# Patient Record
Sex: MALE | Race: WHITE | NOT HISPANIC OR LATINO | Employment: FULL TIME | URBAN - METROPOLITAN AREA
[De-identification: names, ages, dates, MRNs, and addresses within clinical notes are randomized per-mention and may not be internally consistent; named-entity substitution may affect disease eponyms.]

---

## 2018-07-04 ENCOUNTER — OFFICE VISIT (OUTPATIENT)
Dept: URGENT CARE | Facility: CLINIC | Age: 48
End: 2018-07-04
Payer: COMMERCIAL

## 2018-07-04 VITALS
TEMPERATURE: 97.1 F | WEIGHT: 140 LBS | RESPIRATION RATE: 18 BRPM | HEIGHT: 70 IN | HEART RATE: 78 BPM | BODY MASS INDEX: 20.04 KG/M2 | DIASTOLIC BLOOD PRESSURE: 98 MMHG | SYSTOLIC BLOOD PRESSURE: 131 MMHG | OXYGEN SATURATION: 97 %

## 2018-07-04 DIAGNOSIS — J40 BRONCHITIS: Primary | ICD-10-CM

## 2018-07-04 PROCEDURE — 99203 OFFICE O/P NEW LOW 30 MIN: CPT | Performed by: PHYSICIAN ASSISTANT

## 2018-07-04 RX ORDER — PREDNISONE 50 MG/1
50 TABLET ORAL DAILY
Qty: 5 TABLET | Refills: 0 | Status: SHIPPED | OUTPATIENT
Start: 2018-07-04 | End: 2018-07-09

## 2018-07-04 RX ORDER — ALBUTEROL SULFATE 90 UG/1
1 AEROSOL, METERED RESPIRATORY (INHALATION) EVERY 4 HOURS PRN
Qty: 1 INHALER | Refills: 0 | Status: SHIPPED | OUTPATIENT
Start: 2018-07-04 | End: 2018-07-14

## 2018-07-04 RX ORDER — AMOXICILLIN AND CLAVULANATE POTASSIUM 875; 125 MG/1; MG/1
1 TABLET, FILM COATED ORAL EVERY 12 HOURS SCHEDULED
Qty: 20 TABLET | Refills: 0 | Status: SHIPPED | OUTPATIENT
Start: 2018-07-04 | End: 2018-07-14

## 2018-07-04 NOTE — PROGRESS NOTES
[facility]        NAME: Milagros Armijo is a 46 y o  male  : 1970    MRN: [id number]  DATE: 2018  TIME: 9:47 AM    Assessment and Plan   Bronchitis [J40]  1  Bronchitis  amoxicillin-clavulanate (AUGMENTIN) 875-125 mg per tablet    predniSONE 50 mg tablet    albuterol (PROVENTIL HFA,VENTOLIN HFA) 90 mcg/act inhaler     Patient Instructions     Take antibiotic, steroid, and albuterol as prescribed  Follow up with PCP in 3-5 days  Proceed to  ER if symptoms worsen  Chief Complaint     Chief Complaint   Patient presents with    Cold Like Symptoms     sinus pressure, chest congestion and sore throat     History of Present Illness       Cough   This is a new problem  Episode onset: 1 week  The problem has been gradually worsening  The problem occurs every few minutes  The cough is non-productive  Associated symptoms include nasal congestion, postnasal drip, rhinorrhea, a sore throat and wheezing  Pertinent negatives include no chest pain, chills, ear congestion, ear pain, fever, headaches, heartburn, hemoptysis, myalgias, rash, shortness of breath, sweats or weight loss  Nothing aggravates the symptoms  He has tried nothing for the symptoms  The treatment provided no relief  There is no history of asthma, bronchiectasis, bronchitis, COPD, emphysema, environmental allergies or pneumonia  Review of Systems   Review of Systems   Constitutional: Negative for chills, fever and weight loss  HENT: Positive for postnasal drip, rhinorrhea and sore throat  Negative for ear pain  Respiratory: Positive for cough and wheezing  Negative for hemoptysis and shortness of breath  Cardiovascular: Negative for chest pain  Gastrointestinal: Negative for heartburn  Musculoskeletal: Negative for myalgias  Skin: Negative for rash  Allergic/Immunologic: Negative for environmental allergies  Neurological: Negative for headaches           Current Medications       Current Outpatient Prescriptions:     albuterol (PROVENTIL HFA,VENTOLIN HFA) 90 mcg/act inhaler, Inhale 1 puff every 4 (four) hours as needed for wheezing for up to 10 days, Disp: 1 Inhaler, Rfl: 0    amoxicillin-clavulanate (AUGMENTIN) 875-125 mg per tablet, Take 1 tablet by mouth every 12 (twelve) hours for 10 days, Disp: 20 tablet, Rfl: 0    predniSONE 50 mg tablet, Take 1 tablet (50 mg total) by mouth daily for 5 days, Disp: 5 tablet, Rfl: 0    Current Allergies     Allergies as of 07/04/2018 - Reviewed 07/04/2018   Allergen Reaction Noted    Tolectin [tolmetin]  07/04/2018            The following portions of the patient's history were reviewed and updated as appropriate: allergies, current medications, past family history, past medical history, past social history, past surgical history and problem list      Past Medical History:   Diagnosis Date    Arthritis     Emphysema of lung ([address] )        Past Surgical History:   Procedure Laterality Date    KNEE SURGERY Left     POLYPECTOMY         No family history on file  Medications have been verified  Objective   /98   Pulse 78   Temp (!) 97 1 °F (36 2 °C) (Tympanic)   Resp 18   Ht 5' 10" (1 778 m)   Wt 63 5 kg (140 lb)   SpO2 97%   BMI 20 09 kg/m²        Physical Exam     Physical Exam   Constitutional: He appears well-developed  HENT:   Head: Normocephalic  Right Ear: External ear normal    Left Ear: External ear normal    Nose: Mucosal edema and rhinorrhea (purulent) present  Mouth/Throat: Posterior oropharyngeal erythema present  No oropharyngeal exudate or posterior oropharyngeal edema  Cardiovascular: Normal rate, regular rhythm, normal heart sounds and intact distal pulses  Exam reveals no gallop and no friction rub  No murmur heard  Pulmonary/Chest: Effort normal  No respiratory distress  He has no decreased breath sounds  He has wheezes  He has no rhonchi  He has no rales  Dry nonproductive cough   Abdominal: Soft   Bowel sounds are normal  He exhibits no distension  There is no tenderness  There is no rebound and no guarding

## 2021-12-31 ENCOUNTER — OFFICE VISIT (OUTPATIENT)
Dept: URGENT CARE | Facility: CLINIC | Age: 51
End: 2021-12-31
Payer: COMMERCIAL

## 2021-12-31 VITALS
HEIGHT: 70 IN | WEIGHT: 145 LBS | RESPIRATION RATE: 18 BRPM | TEMPERATURE: 96.8 F | HEART RATE: 95 BPM | BODY MASS INDEX: 20.76 KG/M2 | OXYGEN SATURATION: 98 %

## 2021-12-31 DIAGNOSIS — R68.89 FLU-LIKE SYMPTOMS: Primary | ICD-10-CM

## 2021-12-31 PROCEDURE — 87636 SARSCOV2 & INF A&B AMP PRB: CPT | Performed by: EMERGENCY MEDICINE

## 2021-12-31 PROCEDURE — 99203 OFFICE O/P NEW LOW 30 MIN: CPT | Performed by: EMERGENCY MEDICINE

## 2021-12-31 RX ORDER — SUMATRIPTAN 100 MG/1
100 TABLET, FILM COATED ORAL 2 TIMES DAILY PRN
COMMUNITY

## 2022-01-06 LAB
FLUAV RNA RESP QL NAA+PROBE: NEGATIVE
FLUBV RNA RESP QL NAA+PROBE: NEGATIVE
SARS-COV-2 RNA RESP QL NAA+PROBE: POSITIVE

## 2022-01-07 ENCOUNTER — TELEPHONE (OUTPATIENT)
Dept: URGENT CARE | Facility: CLINIC | Age: 52
End: 2022-01-07

## 2024-08-27 ENCOUNTER — APPOINTMENT (OUTPATIENT)
Dept: RADIOLOGY | Facility: CLINIC | Age: 54
End: 2024-08-27
Payer: COMMERCIAL

## 2024-08-27 ENCOUNTER — OFFICE VISIT (OUTPATIENT)
Dept: URGENT CARE | Facility: CLINIC | Age: 54
End: 2024-08-27
Payer: COMMERCIAL

## 2024-08-27 VITALS
WEIGHT: 159.4 LBS | RESPIRATION RATE: 18 BRPM | DIASTOLIC BLOOD PRESSURE: 85 MMHG | OXYGEN SATURATION: 96 % | SYSTOLIC BLOOD PRESSURE: 132 MMHG | HEART RATE: 78 BPM | BODY MASS INDEX: 22.82 KG/M2 | HEIGHT: 70 IN | TEMPERATURE: 98 F

## 2024-08-27 DIAGNOSIS — S29.9XXA RIB INJURY: ICD-10-CM

## 2024-08-27 DIAGNOSIS — M94.0 COSTOCHONDRITIS: Primary | ICD-10-CM

## 2024-08-27 PROCEDURE — 71101 X-RAY EXAM UNILAT RIBS/CHEST: CPT

## 2024-08-27 PROCEDURE — 99213 OFFICE O/P EST LOW 20 MIN: CPT | Performed by: PHYSICIAN ASSISTANT

## 2024-08-27 RX ORDER — PREDNISONE 20 MG/1
50 TABLET ORAL DAILY
Qty: 13 TABLET | Refills: 0 | Status: SHIPPED | OUTPATIENT
Start: 2024-08-27 | End: 2024-09-01

## 2024-08-27 RX ORDER — METHOCARBAMOL 500 MG/1
500 TABLET, FILM COATED ORAL 4 TIMES DAILY
Qty: 30 TABLET | Refills: 0 | Status: SHIPPED | OUTPATIENT
Start: 2024-08-27

## 2024-08-27 NOTE — PROGRESS NOTES
[facility]        NAME: Moises Keller is a 54 y.o. male  : 1970    MRN: [id number]  DATE: 2024  TIME: 4:45 PM    Assessment and Plan   Costochondritis [M94.0]  1. Costochondritis  XR ribs right w pa chest min 3 views    predniSONE 20 mg tablet    methocarbamol (ROBAXIN) 500 mg tablet            Patient Instructions   Ice then heat.  Be sure to take deep breaths to prevent pneumonia.  Medications as prescribed.  Tylenol.  Hand splint.    Follow up with PCP in 3-5 days.  Proceed to  ER if symptoms worsen.    If tests have been performed at [facility], our office will contact you with results if changes need to be made to the care plan discussed with you at the visit.  [facility].    Chief Complaint     Chief Complaint   Patient presents with    Rib Injury     Right rib pain from working on truck and heard a pop since Thursday          History of Present Illness       Patient is a 54-year-old male with significant past medical history presents the office complaining of right rib pain 6 days.  States he was working on his car leaning over the bumper pulling on a wrench when he felt a pop and sudden pain in his right rib.  Pain is rated 4 out of 10 at rest and 10 out of 10 with any movements, twisting, pulling, or deep breaths.  He has not taken anything for pain.  Denies fevers, chills, cough, shortness of breath.        Review of Systems   Review of Systems   Constitutional:  Negative for fever.   Respiratory:  Negative for cough and shortness of breath.    Cardiovascular:  Positive for chest pain (rib).         Current Medications       Current Outpatient Medications:     methocarbamol (ROBAXIN) 500 mg tablet, Take 1 tablet (500 mg total) by mouth 4 (four) times a day, Disp: 30 tablet, Rfl: 0    predniSONE 20 mg tablet, Take 2.5 tablets (50 mg total) by mouth daily for 5 days, Disp: 13 tablet, Rfl: 0    Current Allergies     Allergies as of  "08/27/2024 - Reviewed 08/27/2024   Allergen Reaction Noted    Tolectin [tolmetin] Rash 07/04/2018            The following portions of the patient's history were reviewed and updated as appropriate: allergies, current medications, past family history, past medical history, past social history, past surgical history and problem list.     Past Medical History:   Diagnosis Date    Arthritis     Emphysema of lung (HCC)     Migraines        Past Surgical History:   Procedure Laterality Date    KNEE SURGERY Left     POLYPECTOMY         Family History   Problem Relation Age of Onset    No Known Problems Mother     Diabetes Father          Medications have been verified.        Objective   /85   Pulse 78   Temp 98 °F (36.7 °C) (Tympanic)   Resp 18   Ht 5' 10\" (1.778 m)   Wt 72.3 kg (159 lb 6.4 oz)   SpO2 96%   BMI 22.87 kg/m²   No LMP for male patient.       Physical Exam     Physical Exam  Vitals and nursing note reviewed.   Constitutional:       Appearance: He is well-developed.   HENT:      Head: Normocephalic and atraumatic.      Right Ear: External ear normal.      Left Ear: External ear normal.      Nose: Nose normal.   Eyes:      General: Lids are normal.      Conjunctiva/sclera: Conjunctivae normal.   Cardiovascular:      Rate and Rhythm: Normal rate and regular rhythm.   Pulmonary:      Effort: Pulmonary effort is normal.      Breath sounds: Normal breath sounds.   Chest:      Chest wall: Tenderness present.       Skin:     General: Skin is warm and dry.      Capillary Refill: Capillary refill takes less than 2 seconds.   Neurological:      Mental Status: He is alert.           Right rib and chest x-ray: Within normal limits.  No rib fracture noted.        "

## 2024-11-30 ENCOUNTER — APPOINTMENT (EMERGENCY)
Dept: CT IMAGING | Facility: HOSPITAL | Age: 54
End: 2024-11-30
Payer: COMMERCIAL

## 2024-11-30 ENCOUNTER — HOSPITAL ENCOUNTER (EMERGENCY)
Facility: HOSPITAL | Age: 54
Discharge: HOME | End: 2024-11-30
Attending: EMERGENCY MEDICINE
Payer: COMMERCIAL

## 2024-11-30 VITALS
WEIGHT: 142 LBS | OXYGEN SATURATION: 96 % | HEART RATE: 79 BPM | RESPIRATION RATE: 18 BRPM | TEMPERATURE: 98.2 F | SYSTOLIC BLOOD PRESSURE: 139 MMHG | BODY MASS INDEX: 20.33 KG/M2 | DIASTOLIC BLOOD PRESSURE: 92 MMHG | HEIGHT: 70 IN

## 2024-11-30 DIAGNOSIS — E27.9 ADRENAL NODULE (HCC): ICD-10-CM

## 2024-11-30 DIAGNOSIS — S29.012A STRAIN OF THORACIC BACK REGION: ICD-10-CM

## 2024-11-30 DIAGNOSIS — R10.9 RIGHT FLANK PAIN: ICD-10-CM

## 2024-11-30 DIAGNOSIS — K86.2 PANCREATIC CYST: ICD-10-CM

## 2024-11-30 DIAGNOSIS — R10.11 RIGHT UPPER QUADRANT ABDOMINAL PAIN: Primary | ICD-10-CM

## 2024-11-30 LAB
ALBUMIN SERPL BCG-MCNC: 3.8 G/DL (ref 3.5–5)
ALP SERPL-CCNC: 87 U/L (ref 34–104)
ALT SERPL W P-5'-P-CCNC: 8 U/L (ref 7–52)
ANION GAP SERPL CALCULATED.3IONS-SCNC: 5 MMOL/L (ref 4–13)
AST SERPL W P-5'-P-CCNC: 10 U/L (ref 13–39)
BASOPHILS # BLD AUTO: 0.1 THOUSANDS/ΜL (ref 0–0.1)
BASOPHILS NFR BLD AUTO: 1 % (ref 0–1)
BILIRUB SERPL-MCNC: 0.37 MG/DL (ref 0.2–1)
BILIRUB UR QL STRIP: NEGATIVE
BUN SERPL-MCNC: 11 MG/DL (ref 5–25)
CALCIUM SERPL-MCNC: 8.7 MG/DL (ref 8.4–10.2)
CHLORIDE SERPL-SCNC: 105 MMOL/L (ref 96–108)
CLARITY UR: CLEAR
CO2 SERPL-SCNC: 28 MMOL/L (ref 21–32)
COLOR UR: YELLOW
CREAT SERPL-MCNC: 1 MG/DL (ref 0.6–1.3)
EOSINOPHIL # BLD AUTO: 0.17 THOUSAND/ΜL (ref 0–0.61)
EOSINOPHIL NFR BLD AUTO: 2 % (ref 0–6)
ERYTHROCYTE [DISTWIDTH] IN BLOOD BY AUTOMATED COUNT: 14.2 % (ref 11.6–15.1)
GFR SERPL CREATININE-BSD FRML MDRD: 83 ML/MIN/1.73SQ M
GLUCOSE SERPL-MCNC: 135 MG/DL (ref 65–140)
GLUCOSE UR STRIP-MCNC: NEGATIVE MG/DL
HCT VFR BLD AUTO: 48 % (ref 36.5–49.3)
HGB BLD-MCNC: 15.5 G/DL (ref 12–17)
HGB UR QL STRIP.AUTO: NEGATIVE
IMM GRANULOCYTES # BLD AUTO: 0.04 THOUSAND/UL (ref 0–0.2)
IMM GRANULOCYTES NFR BLD AUTO: 0 % (ref 0–2)
KETONES UR STRIP-MCNC: NEGATIVE MG/DL
LACTATE SERPL-SCNC: 1.3 MMOL/L (ref 0.5–2)
LEUKOCYTE ESTERASE UR QL STRIP: NEGATIVE
LIPASE SERPL-CCNC: 23 U/L (ref 11–82)
LYMPHOCYTES # BLD AUTO: 2.63 THOUSANDS/ΜL (ref 0.6–4.47)
LYMPHOCYTES NFR BLD AUTO: 24 % (ref 14–44)
MCH RBC QN AUTO: 29.7 PG (ref 26.8–34.3)
MCHC RBC AUTO-ENTMCNC: 32.3 G/DL (ref 31.4–37.4)
MCV RBC AUTO: 92 FL (ref 82–98)
MONOCYTES # BLD AUTO: 0.81 THOUSAND/ΜL (ref 0.17–1.22)
MONOCYTES NFR BLD AUTO: 7 % (ref 4–12)
NEUTROPHILS # BLD AUTO: 7.28 THOUSANDS/ΜL (ref 1.85–7.62)
NEUTS SEG NFR BLD AUTO: 66 % (ref 43–75)
NITRITE UR QL STRIP: NEGATIVE
NRBC BLD AUTO-RTO: 0 /100 WBCS
PH UR STRIP.AUTO: 6.5 [PH]
PLATELET # BLD AUTO: 210 THOUSANDS/UL (ref 149–390)
PMV BLD AUTO: 10.7 FL (ref 8.9–12.7)
POTASSIUM SERPL-SCNC: 4.1 MMOL/L (ref 3.5–5.3)
PROT SERPL-MCNC: 6.5 G/DL (ref 6.4–8.4)
PROT UR STRIP-MCNC: NEGATIVE MG/DL
RBC # BLD AUTO: 5.22 MILLION/UL (ref 3.88–5.62)
SODIUM SERPL-SCNC: 138 MMOL/L (ref 135–147)
SP GR UR STRIP.AUTO: 1.02 (ref 1–1.03)
UROBILINOGEN UR QL STRIP.AUTO: 0.2 E.U./DL
WBC # BLD AUTO: 11.03 THOUSAND/UL (ref 4.31–10.16)

## 2024-11-30 PROCEDURE — 85025 COMPLETE CBC W/AUTO DIFF WBC: CPT | Performed by: EMERGENCY MEDICINE

## 2024-11-30 PROCEDURE — 83605 ASSAY OF LACTIC ACID: CPT | Performed by: EMERGENCY MEDICINE

## 2024-11-30 PROCEDURE — 80053 COMPREHEN METABOLIC PANEL: CPT | Performed by: EMERGENCY MEDICINE

## 2024-11-30 PROCEDURE — 99285 EMERGENCY DEPT VISIT HI MDM: CPT | Performed by: EMERGENCY MEDICINE

## 2024-11-30 PROCEDURE — 81003 URINALYSIS AUTO W/O SCOPE: CPT | Performed by: EMERGENCY MEDICINE

## 2024-11-30 PROCEDURE — 96375 TX/PRO/DX INJ NEW DRUG ADDON: CPT

## 2024-11-30 PROCEDURE — 96376 TX/PRO/DX INJ SAME DRUG ADON: CPT

## 2024-11-30 PROCEDURE — 36415 COLL VENOUS BLD VENIPUNCTURE: CPT | Performed by: EMERGENCY MEDICINE

## 2024-11-30 PROCEDURE — 99284 EMERGENCY DEPT VISIT MOD MDM: CPT

## 2024-11-30 PROCEDURE — 74177 CT ABD & PELVIS W/CONTRAST: CPT

## 2024-11-30 PROCEDURE — 96374 THER/PROPH/DIAG INJ IV PUSH: CPT

## 2024-11-30 PROCEDURE — 96361 HYDRATE IV INFUSION ADD-ON: CPT

## 2024-11-30 PROCEDURE — 83690 ASSAY OF LIPASE: CPT | Performed by: EMERGENCY MEDICINE

## 2024-11-30 RX ORDER — KETOROLAC TROMETHAMINE 30 MG/ML
15 INJECTION, SOLUTION INTRAMUSCULAR; INTRAVENOUS ONCE
Status: COMPLETED | OUTPATIENT
Start: 2024-11-30 | End: 2024-11-30

## 2024-11-30 RX ORDER — CYCLOBENZAPRINE HCL 10 MG
10 TABLET ORAL ONCE
Status: COMPLETED | OUTPATIENT
Start: 2024-11-30 | End: 2024-11-30

## 2024-11-30 RX ORDER — CYCLOBENZAPRINE HCL 10 MG
10 TABLET ORAL 3 TIMES DAILY PRN
Qty: 20 TABLET | Refills: 0 | Status: SHIPPED | OUTPATIENT
Start: 2024-11-30

## 2024-11-30 RX ORDER — IBUPROFEN 600 MG/1
600 TABLET, FILM COATED ORAL EVERY 8 HOURS PRN
Qty: 30 TABLET | Refills: 0 | Status: SHIPPED | OUTPATIENT
Start: 2024-11-30

## 2024-11-30 RX ORDER — ONDANSETRON 2 MG/ML
4 INJECTION INTRAMUSCULAR; INTRAVENOUS ONCE
Status: COMPLETED | OUTPATIENT
Start: 2024-11-30 | End: 2024-11-30

## 2024-11-30 RX ADMIN — IOHEXOL 85 ML: 350 INJECTION, SOLUTION INTRAVENOUS at 20:26

## 2024-11-30 RX ADMIN — KETOROLAC TROMETHAMINE 15 MG: 30 INJECTION, SOLUTION INTRAMUSCULAR; INTRAVENOUS at 19:14

## 2024-11-30 RX ADMIN — SODIUM CHLORIDE 1000 ML: 0.9 INJECTION, SOLUTION INTRAVENOUS at 19:14

## 2024-11-30 RX ADMIN — CYCLOBENZAPRINE 10 MG: 10 TABLET, FILM COATED ORAL at 22:10

## 2024-11-30 RX ADMIN — ONDANSETRON 4 MG: 2 INJECTION INTRAMUSCULAR; INTRAVENOUS at 19:14

## 2024-11-30 RX ADMIN — KETOROLAC TROMETHAMINE 15 MG: 30 INJECTION, SOLUTION INTRAMUSCULAR; INTRAVENOUS at 22:10

## 2024-11-30 NOTE — ED PROVIDER NOTES
Time reflects when diagnosis was documented in both MDM as applicable and the Disposition within this note       Time User Action Codes Description Comment    11/30/2024 10:35 PM Theodore Castaneda [R10.11] Right upper quadrant abdominal pain     11/30/2024 10:35 PM Theodore Castaneda [R10.9] Right flank pain     11/30/2024 10:35 PM Theodore Castaneda [S29.012A] Strain of thoracic back region     11/30/2024 10:35 PM Theodore Castaneda [K86.2] Pancreatic cyst     11/30/2024 10:37 PM Theodore Castaneda [E27.9] Adrenal nodule (HCC)           ED Disposition       ED Disposition   Discharge    Condition   Stable    Date/Time   Sat Nov 30, 2024 10:34 PM    Comment   Moises Keller discharge to home/self care.                   Assessment & Plan       Medical Decision Making  Patient seen and evaluated.  Presents with right upper quadrant abdominal pain/right flank pain as described.  Workup as shown.  Differential diagnosis included acute cholecystitis, biliary colic, pancreatitis, small bowel obstruction, renal colic, pyelonephritis, sepsis, other.  Medicated as noted.  Improved with Toradol in the ED.  ED course as noted.  No concerning laboratory abnormalities.  Based on normal ED workup and patient's while localized pain, suspect musculoskeletal etiology to patient's pain.  Patient and daughter at bedside concerned about family history of gallbladder disease.  Did recommend outpatient follow-up for consideration of further workup as indicated.  Will prescribe ibuprofen and Flexeril.  Patient also advised of incidental findings of pancreatic head cyst, which she was already aware, as well as left adrenal nodule.  He expressed understanding.  Stable for discharge from the emergency department.  Return precautions reviewed.  All questions answered.    Amount and/or Complexity of Data Reviewed  Labs: ordered.  Radiology: ordered.    Risk  Prescription drug management.        ED Course as of 11/30/24 2241   Sat Nov 30,  2024 2154 Blood work and CT results discussed with patient.  Reports partial improvement after receiving Toradol.  Patient clarified that although his pain did start after eating, it was quite sometime after the eating, he does not think that his pain is necessarily related to food intake.  He states the pain is seemingly well localized to the right flank area.  Will give additional Toradol and Flexeril.  Able to provide urine sample at this time.       Medications   sodium chloride 0.9 % bolus 1,000 mL (0 mL Intravenous Stopped 11/30/24 2014)   ketorolac (TORADOL) injection 15 mg (15 mg Intravenous Given 11/30/24 1914)   ondansetron (ZOFRAN) injection 4 mg (4 mg Intravenous Given 11/30/24 1914)   iohexol (OMNIPAQUE) 350 MG/ML injection (MULTI-DOSE) 85 mL (85 mL Intravenous Given 11/30/24 2026)   ketorolac (TORADOL) injection 15 mg (15 mg Intravenous Given 11/30/24 2210)   cyclobenzaprine (FLEXERIL) tablet 10 mg (10 mg Oral Given 11/30/24 2210)       ED Risk Strat Scores                                               History of Present Illness       Chief Complaint   Patient presents with    Flank Pain     Pt has worsening right flank pain since Thursday that radiates into his mid-lower abdomen. Pt also having pain in his right shoulder and states the pain worsens after eating        Past Medical History:   Diagnosis Date    Arthritis     Emphysema of lung (HCC)     Migraines       Past Surgical History:   Procedure Laterality Date    KNEE SURGERY Left     POLYPECTOMY        Family History   Problem Relation Age of Onset    No Known Problems Mother     Diabetes Father       Social History     Tobacco Use    Smoking status: Every Day     Current packs/day: 1.00     Types: Cigarettes    Smokeless tobacco: Never   Vaping Use    Vaping status: Never Used   Substance Use Topics    Alcohol use: Not Currently    Drug use: Not Currently      E-Cigarette/Vaping    E-Cigarette Use Never User       E-Cigarette/Vaping  Substances      I have reviewed and agree with the history as documented.     Patient presents to the emergency department for evaluation of right-sided abdominal pain that has been present since Thursday.  Seems to be worse with eating.  Has happened occasionally prior to Thursday, but has been more persistent since Thursday.  Seems to start in the right side of the abdomen, radiates to the flank, and also to the right shoulder.  No associated fevers, chills, nausea, vomiting, diarrhea, or urinary symptoms.  Patient's daughters recently had their gallbladders out.  Patient denies any prior abdominal surgeries or known history of kidney stones.  Has not taken any medications for the symptoms.  Rates the pain at 10 out of 10.  No other complaints, modifying factors, or associated symptoms.        Review of Systems   All other systems reviewed and are negative.          Objective       ED Triage Vitals   Temperature Pulse Blood Pressure Respirations SpO2 Patient Position - Orthostatic VS   11/30/24 1835 11/30/24 1835 11/30/24 1837 11/30/24 1835 11/30/24 1835 11/30/24 1835   98.2 °F (36.8 °C) 99 (!) 159/107 18 98 % Sitting      Temp Source Heart Rate Source BP Location FiO2 (%) Pain Score    11/30/24 1835 11/30/24 1835 11/30/24 1835 -- 11/30/24 1835    Temporal Monitor Left arm  10 - Worst Possible Pain      Vitals      Date and Time Temp Pulse SpO2 Resp BP Pain Score FACES Pain Rating User   11/30/24 2219 -- 79 96 % 18 139/92 -- -- MD   11/30/24 2210 -- -- -- -- -- 4 -- MD   11/30/24 1945 -- -- -- -- -- 6 -- MD   11/30/24 1914 -- -- -- -- -- 10 - Worst Possible Pain -- MD   11/30/24 1837 -- -- -- -- 159/107 -- --    11/30/24 1835 98.2 °F (36.8 °C) 99 98 % 18 -- 10 - Worst Possible Pain --             Physical Exam  Vitals and nursing note reviewed.   Constitutional:       General: He is not in acute distress.     Appearance: Normal appearance. He is well-developed.   HENT:      Head: Normocephalic and atraumatic.       Right Ear: External ear normal.      Left Ear: External ear normal.      Nose: Nose normal.   Eyes:      Extraocular Movements: Extraocular movements intact.      Conjunctiva/sclera: Conjunctivae normal.      Pupils: Pupils are equal, round, and reactive to light.   Cardiovascular:      Rate and Rhythm: Normal rate and regular rhythm.      Pulses: Normal pulses.      Heart sounds: Normal heart sounds. No murmur heard.  Pulmonary:      Effort: Pulmonary effort is normal. No respiratory distress.      Breath sounds: Normal breath sounds. No wheezing, rhonchi or rales.   Abdominal:      Palpations: Abdomen is soft.      Tenderness: There is abdominal tenderness in the right upper quadrant. There is right CVA tenderness. There is no left CVA tenderness, guarding or rebound.      Comments: Mild tenderness to palpation in the right mid abdomen and right upper quadrant, more severe right CVA tenderness.   Musculoskeletal:         General: No swelling. Normal range of motion.      Cervical back: Normal range of motion and neck supple.   Skin:     General: Skin is warm and dry.      Capillary Refill: Capillary refill takes less than 2 seconds.   Neurological:      General: No focal deficit present.      Mental Status: He is alert and oriented to person, place, and time.   Psychiatric:         Mood and Affect: Mood normal.         Behavior: Behavior normal.         Results Reviewed       Procedure Component Value Units Date/Time    UA (URINE) with reflex to Scope [[id number]] Collected: 11/30/24 2211    Lab Status: Final result Specimen: Urine, Clean Catch Updated: 11/30/24 2225     Color, UA Yellow     Clarity, UA Clear     Specific Gravity, UA 1.020     pH, UA 6.5     Leukocytes, UA Negative     Nitrite, UA Negative     Protein, UA Negative mg/dl      Glucose, UA Negative mg/dl      Ketones, UA Negative mg/dl      Urobilinogen, UA 0.2 E.U./dl      Bilirubin, UA Negative     Occult Blood, UA Negative    Lactic acid,  plasma (w/reflex if result > 2.0) [[id number]]  (Normal) Collected: 11/30/24 1917    Lab Status: Final result Specimen: Blood from Arm, Right Updated: 11/30/24 1942     LACTIC ACID 1.3 mmol/L     Narrative:      Result may be elevated if tourniquet was used during collection.    Comprehensive metabolic panel [[id number]]  (Abnormal) Collected: 11/30/24 1917    Lab Status: Final result Specimen: Blood from Arm, Right Updated: 11/30/24 1942     Sodium 138 mmol/L      Potassium 4.1 mmol/L      Chloride 105 mmol/L      CO2 28 mmol/L      ANION GAP 5 mmol/L      BUN 11 mg/dL      Creatinine 1.00 mg/dL      Glucose 135 mg/dL      Calcium 8.7 mg/dL      AST 10 U/L      ALT 8 U/L      Alkaline Phosphatase 87 U/L      Total Protein 6.5 g/dL      Albumin 3.8 g/dL      Total Bilirubin 0.37 mg/dL      eGFR 83 ml/min/1.73sq m     Narrative:      National Kid[employer] Disease Foundation guidelines for Chronic Kidney Disease (CKD):     Stage 1 with normal or high GFR (GFR > 90 mL/min/1.73 square meters)    Stage 2 Mild CKD (GFR = 60-89 mL/min/1.73 square meters)    Stage 3A Moderate CKD (GFR = 45-59 mL/min/1.73 square meters)    Stage 3B Moderate CKD (GFR = 30-44 mL/min/1.73 square meters)    Stage 4 Severe CKD (GFR = 15-29 mL/min/1.73 square meters)    Stage 5 End Stage CKD (GFR <15 mL/min/1.73 square meters)  Note: GFR calculation is accurate only with a steady state creatinine    Lipase [[id number]]  (Normal) Collected: 11/30/24 1917    Lab Status: Final result Specimen: Blood from Arm, Right Updated: 11/30/24 1942     Lipase 23 u/L     CBC and differential [[id number]]  (Abnormal) Collected: 11/30/24 1917    Lab Status: Final result Specimen: Blood from Arm, Right Updated: 11/30/24 1925     WBC 11.03 Thousand/uL      RBC 5.22 Million/uL      Hemoglobin 15.5 g/dL      Hematocrit 48.0 %      MCV 92 fL      MCH 29.7 pg      MCHC 32.3 g/dL      RDW 14.2 %      MPV 10.7 fL      Platelets 210 Thousands/uL      nRBC 0 /100 WBCs      Segmented  % 66 %      Immature Grans % 0 %      Lymphocytes % 24 %      Monocytes % 7 %      Eosinophils Relative 2 %      Basophils Relative 1 %      Absolute Neutrophils 7.28 Thousands/µL      Absolute Immature Grans 0.04 Thousand/uL      Absolute Lymphocytes 2.63 Thousands/µL      Absolute Monocytes 0.81 Thousand/µL      Eosinophils Absolute 0.17 Thousand/µL      Basophils Absolute 0.10 Thousands/µL             CT abdomen pelvis with contrast   Final Interpretation by Erica Irizarry MD (11/30 2120)      No evidence of acute intra-abdominal or pelvic pathology      1.5 cm pancreatic head cyst. For simple cyst(s) between 1.5 to 2.0 cm, recommend follow-up every 6 months for 4 times, then every 1 year for 2 times, then every 2 years for 3 times. If stable after 10 years, then no more follow-ups (If patient reaches    age 80, then can switch to age >80 algorithm). Recommend next follow-up in 6 months. Preferred imaging modality: abdomen MRI and MRCP with and without IV contrast, or triple phase abdomen CT with IV contrast, or abdomen MRI and MRCP without IV contrast.                              Workstation performed: [id number]             Procedures    ED Medication and Procedure Management   Prior to Admission Medications   Prescriptions Last Dose Informant Patient Reported? Taking?   methocarbamol (ROBAXIN) 500 mg tablet   No No   Sig: Take 1 tablet (500 mg total) by mouth 4 (four) times a day      Facility-Administered Medications: None     Patient's Medications   Discharge Prescriptions    CYCLOBENZAPRINE (FLEXERIL) 10 MG TABLET    Take 1 tablet (10 mg total) by mouth 3 (three) times a day as needed for muscle spasms       Start Date: 11/30/2024End Date: --       Order Dose: 10 mg       Quantity: 20 tablet    Refills: 0    IBUPROFEN (MOTRIN) 600 MG TABLET    Take 1 tablet (600 mg total) by mouth every 8 (eight) hours as needed for mild pain or moderate pain       Start Date: 11/30/2024End Date: --       Order Dose: 600 mg        Quantity: 30 tablet    Refills: 0     No discharge procedures on file.  ED SEPSIS DOCUMENTATION   Time reflects when diagnosis was documented in both MDM as applicable and the Disposition within this note       Time User Action Codes Description Comment    11/30/2024 10:35 PM Theodore Castaneda [R10.11] Right upper quadrant abdominal pain     11/30/2024 10:35 PM Theodore Castaneda [R10.9] Right flank pain     11/30/2024 10:35 PM Theodore Castaneda [S29.012A] Strain of thoracic back region     11/30/2024 10:35 PM Theodore Castaneda [K86.2] Pancreatic cyst     11/30/2024 10:37 PM Theodore Castaneda [E27.9] Adrenal nodule (HCC)                  Theodore Castaneda MD  11/30/24 7519

## 2024-12-01 NOTE — ED NOTES
Pt is comfortable in bed, waiting for CT scan to be formally read, daughter at bedside for support     Nereyda Riggs RN  11/30/24 2071

## 2024-12-01 NOTE — DISCHARGE INSTRUCTIONS
Take medication as prescribed as needed.  Follow-up with primary care for consideration of further workup as indicated.  Also follow-up regarding incidental findings needing periodic surveillance imaging, specifically pancreatic head cyst and left adrenal nodule.  Return to the ED for any new or worsening symptoms or concerns.